# Patient Record
Sex: FEMALE | Race: WHITE
[De-identification: names, ages, dates, MRNs, and addresses within clinical notes are randomized per-mention and may not be internally consistent; named-entity substitution may affect disease eponyms.]

---

## 2019-01-21 ENCOUNTER — HOSPITAL ENCOUNTER (INPATIENT)
Dept: HOSPITAL 35 - ER | Age: 59
LOS: 3 days | Discharge: HOME | DRG: 881 | End: 2019-01-24
Attending: PSYCHIATRY & NEUROLOGY | Admitting: PSYCHIATRY & NEUROLOGY
Payer: COMMERCIAL

## 2019-01-21 VITALS — HEIGHT: 65 IN | WEIGHT: 175 LBS | BODY MASS INDEX: 29.16 KG/M2

## 2019-01-21 VITALS — SYSTOLIC BLOOD PRESSURE: 124 MMHG | DIASTOLIC BLOOD PRESSURE: 79 MMHG

## 2019-01-21 VITALS — DIASTOLIC BLOOD PRESSURE: 85 MMHG | SYSTOLIC BLOOD PRESSURE: 126 MMHG

## 2019-01-21 VITALS — SYSTOLIC BLOOD PRESSURE: 141 MMHG | DIASTOLIC BLOOD PRESSURE: 93 MMHG

## 2019-01-21 VITALS — DIASTOLIC BLOOD PRESSURE: 86 MMHG | SYSTOLIC BLOOD PRESSURE: 128 MMHG

## 2019-01-21 VITALS — SYSTOLIC BLOOD PRESSURE: 126 MMHG | DIASTOLIC BLOOD PRESSURE: 85 MMHG

## 2019-01-21 DIAGNOSIS — Z23: ICD-10-CM

## 2019-01-21 DIAGNOSIS — R45.851: ICD-10-CM

## 2019-01-21 DIAGNOSIS — F32.9: Primary | ICD-10-CM

## 2019-01-21 DIAGNOSIS — F68.8: ICD-10-CM

## 2019-01-21 DIAGNOSIS — R51: ICD-10-CM

## 2019-01-21 DIAGNOSIS — Z85.3: ICD-10-CM

## 2019-01-21 DIAGNOSIS — F41.9: ICD-10-CM

## 2019-01-21 DIAGNOSIS — R41.9: ICD-10-CM

## 2019-01-21 DIAGNOSIS — F10.10: ICD-10-CM

## 2019-01-21 DIAGNOSIS — Z90.12: ICD-10-CM

## 2019-01-21 DIAGNOSIS — Z59.0: ICD-10-CM

## 2019-01-21 DIAGNOSIS — Z90.710: ICD-10-CM

## 2019-01-21 DIAGNOSIS — F12.10: ICD-10-CM

## 2019-01-21 LAB
ALBUMIN SERPL-MCNC: 4 G/DL (ref 3.4–5)
ALT SERPL-CCNC: 60 U/L (ref 30–65)
ANION GAP SERPL CALC-SCNC: 13 MMOL/L (ref 7–16)
AST SERPL-CCNC: 38 U/L (ref 15–37)
BILIRUB DIRECT SERPL-MCNC: < 0.1 MG/DL
BILIRUB SERPL-MCNC: 0.1 MG/DL
BILIRUB UR-MCNC: NEGATIVE MG/DL
BUN SERPL-MCNC: 15 MG/DL (ref 7–18)
CALCIUM SERPL-MCNC: 8.7 MG/DL (ref 8.5–10.1)
CHLORIDE SERPL-SCNC: 106 MMOL/L (ref 98–107)
CO2 SERPL-SCNC: 25 MMOL/L (ref 21–32)
COLOR UR: YELLOW
CREAT SERPL-MCNC: 0.7 MG/DL (ref 0.6–1)
ERYTHROCYTE [DISTWIDTH] IN BLOOD BY AUTOMATED COUNT: 13 % (ref 10.5–14.5)
FOLATE SERPL-MCNC: 19.2 NG/ML (ref 8.6–58.9)
GLUCOSE SERPL-MCNC: 112 MG/DL (ref 74–106)
HCT VFR BLD CALC: 45.1 % (ref 37–47)
HGB BLD-MCNC: 15.5 GM/DL (ref 12–15)
KETONES UR STRIP-MCNC: NEGATIVE MG/DL
MCH RBC QN AUTO: 33.2 PG (ref 26–34)
MCHC RBC AUTO-ENTMCNC: 34.3 G/DL (ref 28–37)
MCV RBC: 96.8 FL (ref 80–100)
PLATELET # BLD: 272 THOU/UL (ref 150–400)
POTASSIUM SERPL-SCNC: 3.8 MMOL/L (ref 3.5–5.1)
PROT SERPL-MCNC: 7.6 G/DL (ref 6.4–8.2)
RBC # BLD AUTO: 4.66 MIL/UL (ref 4.2–5)
RBC # UR STRIP: NEGATIVE /UL
SODIUM SERPL-SCNC: 144 MMOL/L (ref 136–145)
SP GR UR STRIP: >= 1.03 (ref 1–1.03)
URINE CLARITY: CLEAR
URINE GLUCOSE-RANDOM*: NEGATIVE
URINE LEUKOCYTES-REFLEX: NEGATIVE
URINE NITRITE-REFLEX: NEGATIVE
URINE PROTEIN (DIPSTICK): NEGATIVE
UROBILINOGEN UR STRIP-ACNC: 0.2 E.U./DL (ref 0.2–1)
VIT B12 SERPL-MCNC: 436 PG/ML (ref 193–986)
WBC # BLD AUTO: 6.7 THOU/UL (ref 4–11)

## 2019-01-21 PROCEDURE — 10880: CPT

## 2019-01-21 SDOH — ECONOMIC STABILITY - HOUSING INSECURITY: HOMELESSNESS: Z59.0
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
## 2019-01-21 NOTE — NUR
PATIENT ADMITTED [facility] [facility], ROOM 520
FOR SI.  PATIENT PRESENTED TO [facility] ED AT APPROXIMATELY
5AM THIS DATE.  STATED TO ED STAFF THAT SHE WAS WANTING TO KILL HERSELF.
PATIENT TOLD THIS NURSE DURING [facility] SCREENING THAT "MY
 KICKED ME OUT," AND THAT SHE WAS HOMELESS AT THE PRESENT TIME.
PATIENT DENIED HOMICIDAL IDEATION; HOWEVER, PATIENT SEEMED TO HESITATE, TWICE,
WHEN QUESTIONED ABOUT ANY THOOUGHTS OF HARMING SOMEONE ELSE.  PATIENT WAS
HOSPITALIZED AT A TREATMENT CENTER IN [place], [place] FOR ALCOHOLISM ABOUT
THREE MONTHS; WAS IN-PATIENT FOR ABOUT A MONTH.  tAKES NO MEDICATION OF ANY
KIND AT PRESENT.  HOWEVER, WAS TAKING LEXAPRO, 20 MG, UNTIL ABOUT A MONTH AGO.
STATED THAT SHE "TAPERED HERSELF OFF BECAUSE I THOUGHT THAT I COULD DO IT."
AWAITING NEW MEDICATION ORDERS.
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
## 2019-01-21 NOTE — NUR
JANES mwt with Colleen Corteser from [facility] concerning admission of Pt.  Ms. Burgos stated they can admit Pt whenever she is discharged from [facility].  Ms. Burgos also stated she will fax over some paper work that would need
to be completed by SW prior to Pt admission.  -+
 
 
 
 
 
 
 
 
 
 
 
 
 
 
 
 
 
 
 
 
 
 
 
 
 
 
 
SW met with Colleen Burgos from [facility].  Ms. Burgos stated  the Pt can be
admitted upon discharge.  Ms. Burgos also infomed she would be sending over
some paper work that would need to be completed by SW prior to Pt being
admitted. I provided Ms. Burgos with [employer]'s business card and  and
informed her to contact [employer] concerning the paper ork as she is the SW on
duty M-F.  Ms. Burgos did not have any further questions or concerne

## 2019-01-22 VITALS — DIASTOLIC BLOOD PRESSURE: 93 MMHG | SYSTOLIC BLOOD PRESSURE: 142 MMHG

## 2019-01-22 VITALS — DIASTOLIC BLOOD PRESSURE: 82 MMHG | SYSTOLIC BLOOD PRESSURE: 113 MMHG

## 2019-01-22 VITALS — SYSTOLIC BLOOD PRESSURE: 113 MMHG | DIASTOLIC BLOOD PRESSURE: 82 MMHG
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
## 2019-01-22 NOTE — NUR
PT COOPERATIVE. DENIES SI. CLAIMS SHE IS WORRIED WHERE SHE WILL GO FROM HERE.
SEEMS CLEAR TO HER THAT SHE NEEDS TO ADDRESS SUSTANCE ABUSE ISSUES, AND
DEPRESSION TOGETHER TO CLARIFY THE FUTURE. QUIET AND COOPERATIVE. STAYED IN
HER ROOM ALL EVENING, READING AND NAPPING. SLEPT WELL ON C/O THROUGH THE
NIGHT.

## 2019-01-22 NOTE — NUR
CLIENT DENIES HEADACHE AT THIS TIME. CLIENT STATED SHE HAS SOME NAUSEA. CLIENT
UP AD RICHA IN ROOM AND GAMBLE. LUNGS CLEAR. WANTS SHOWER THIS AM AND PUT ON
REGULAR CLOTHES. DENIES ANY ETOH WITHDRAWL SUCH AS TREMORS OR HEADACHE.

## 2019-01-22 NOTE — NUR
CLIENT HAS DENIED ANY ETOH WITHDRAWL SYMPTOMS. READING MAGAZINE IN DINNING
ROOM MOST OF DAY, TOOK A SHOWER TODAY, AND VISITED WITH NURSE AND

## 2019-01-23 VITALS — SYSTOLIC BLOOD PRESSURE: 119 MMHG | DIASTOLIC BLOOD PRESSURE: 77 MMHG

## 2019-01-23 VITALS — DIASTOLIC BLOOD PRESSURE: 89 MMHG | SYSTOLIC BLOOD PRESSURE: 133 MMHG

## 2019-01-23 NOTE — NUR
ASSUMED PT CARE 1900. PT ALERT AND ORIENTED. CURRENTLY READING IN BED. PT
DENIES N/V, HA, TREMORS, OTHER SYMPTOMS OF WITHDRAW. PT CALM AND COOPERATIVE.
STATES SHE AND  HAD GOOD VISIT AND HAVE A PLAN IN PLACE UPON D/C.
CONTINUING TO MONITOR, WILL CONTINUE POC UNTIL EOS.

## 2019-01-23 NOTE — NUR
PATIENT IS ALERT AND ORIENTED X 4, UP AND OUT ON THE UNIT. PATIENT TOOK ALL
MEDICATION WHOLE WITHOUT DIFFICULTY. PATIENT DENIES SIGNS OF ALCOHOL
WITHDRAWAL, NO TREMORS, OR ANXIETY NOTED.  PATIENT DENIES SUICIDAL AND
HOMOCIDAL IDEATION.  SHE RATED DEPRESSION 5/10, SHE DENIES ANXIETY. PATIENT
WAS TEARFUL THIS MORNING, " I DON'T KNOW WHERE TO GO WHEN I AM DISCHARGED"
PATIENT STATES SHE FEELS GREAT AFTER HAVING A FRUITFUL MEETING WITH HER
 AND THE DOCTOR.  AFFECT IS BRIGHT, SHE IS CURRENTLY IN DAY ROOM
WATCHING TV, WILL MONITOR FOR SAFETY.

## 2019-01-23 NOTE — NUR
ALBERT SLEPT GOOD,  REQUESTED DOOR BE CLOSED DUE TO TOO MANY WANDERERS AND
TALKING LOUD,  KEPT HELSELF BUSY BY READING BOOK.  DENIES PAIN, TOOK MEDS WITH
NO PROBLEM, REFUSED THE COLACE, SAID SHE IS VERY REGULAR,  NO BEHAVIOUR
PROBLEM NOTED, CONTINUE TO MONITOR FOR ALCOHOL WITHDRAWAL SYMPTOMS.

## 2019-01-24 VITALS — DIASTOLIC BLOOD PRESSURE: 81 MMHG | SYSTOLIC BLOOD PRESSURE: 114 MMHG

## 2019-01-24 VITALS — SYSTOLIC BLOOD PRESSURE: 114 MMHG | DIASTOLIC BLOOD PRESSURE: 81 MMHG

## 2019-01-24 NOTE — NUR
JANES met with pt to schedule her appointment with [facility] for the Php
program. Pt stated that she has lucio with depression, and alcoholism. Pt
stated that she drinks a pint of Vodka a day or even more if she feels severly
depressed. JANES was able to schedule appointment on February 1, 2019 for dual
diagnosis. JANES will follow-up with pt upon discharge.

## 2019-01-24 NOTE — NUR
ASSUMED PATIENT CARE AT 0700.  PATIENT UP IN HER BATHROOM, BRUSHING HER TEETH.
CALM AND COOPERATIVE; SAD AFFECT.  NO BEHAVIORAL ISSUES, NO S/S OF ALCOHOL
WITHDRAWAL.  VITAL SIGNS STABLE.  TOOK ALL A.M. MEDICATIONS.  DISCHARGE BEING
PLANNED TODAY.  FAMILY WILL PICK HER UP SOMETIME AFTER 3 P.M., PER PATIENT.
 
CONTINUE TO MONITOR.

## 2019-01-24 NOTE — NUR
Patient Name: ALBERT PIERRE
Admission Date: 01/21/19
DISCHARGE PLAN: Pt plans to discharge to her parents home in [place].
Care Assessment: Pt reassesed for PhP program through [facility] in [facility].
Level II Assessment: There was no level II screening done due to pt cognitive
enough to take care of self.
Transportation: Pt was transported by her father.
Special Instructions/Notes: Pt has appointment with [facility] on February 1,
2019. At 1:00pm. Pt was given an print out of her appointment.
 
DISCHARGE TO FACILITY: None
Facility:    Phone:   Fax:
Address:
Contact Name:   Phone:
PCP: VIOLETA
Psychiatrist: [facility] assigned psychiatrist.

## 2019-01-28 NOTE — D
[facility]
[address]
[place]   [address]                     DISCHARGE SUMMARY             
_______________________________________________________________________________
 
Name:       ALBERT PIERRE                  Room #:         520B-B      [facility] [place]  
M.R.#:      [id number]                       Account #:      [id number]  
Admission:  01/21/19    Attend Phys:    Juaquin Zafar DO
Discharge:  01/24/19    Date of Birth:  [birth year]  
                                                          Report #: [id number]
                                                                    [id number]   
_______________________________________________________________________________
THIS REPORT FOR:   //name//                          
 
CC: Juaquin Zafar
    Charlie Quinetro
 
DATE OF SERVICE:  01/24/2019
 
 
MEDICAL CONSULTANT THIS ADMISSIONS:  Travis Childers MD.
 
DISCHARGE DIET:  Regular.  Absolutely no alcohol, illicit drugs including
marijuana.  Activity level as tolerated.
 
DISCHARGE MEDICATIONS:  As follows:  Citalopram 10 mg oral daily for depression,
is given #30.  Calcium carbonate with vitamin D3 of 600 mg 2 tabs p.o. daily. 
Docusate, Colace, 100 mg oral twice, use over-the-counter.  Famotidine 20 mg
twice a day over-the-counter, cyanocobalamin 1000 mcg oral daily for suboptimal
vitamin B12 and prenatal chewable vitamin for supplementation. 
 
Aftercare as follows:  The patient will be discharged to her parents' home.  The
patient is scheduled for appointment with [facility] for PHP treatment.  She is
battling with depression, alcoholism.  Appointment scheduled for 02/01/2019 for
dual diagnosis PHP, transport own, by her father.  Appointment time:  1:00 p.m.
 
REASON FOR ADMISSION:  Suicidal ideation, superficial lacerations to her left
wrist.  Also, recent binge drinking of alcohol.
 
HOSPITAL COURSE:  The patient was admitted to the Geriatric Psychiatry Unit. 
She was placed on a CIWA protocol.  She did not use any lorazepam or have any
withdrawal complications.  She does not meet criteria for major depression,
however, benefited from being on antidepressants in the past.  I elected to
restart her on citalopram 10 mg daily.  She tolerated this well.  She was
sleeping well, eating well, prior to day of discharge, not voicing suicidal or
homicidal ideations, felt to be stable for discharge to the community.
 
VITAL SIGNS:  On day of discharge, temperature 35.7, pulse rate 80, respirations
18, /81.
 
LABORATORIES THIS ADMISSION:  Urine was within normal limits.  Acetaminophen was
less than 2.  Serum alcohol 150 at admission.  Marijuana was positive.  UDS was
otherwise negative.  Chemistries' estimation within normal limits except for
glucose 112.  On her liver function panel:  AST was 38, ALT is 60.  Vitamin B12
was 436 and was under 500.  I started her on daily 1000 mcg cyanocobalamin
replacement.  Folate was 19.2 within normal limits.  TSH was 1.501.  Hematology,
the patient had a hemoglobin of 15.5, which was slightly elevated, perhaps due
relative dehydration.  White blood cell count 6.7 and platelet count 272.
 
 
 
[address]   [address]                     DISCHARGE SUMMARY             
_______________________________________________________________________________
 
Name:       ALBERT PIERRE                  Room #:         520B-B      [facility] [place]  
M.R.#:      [id number]                       Account #:      [id number]  
Admission:  01/21/19    Attend Phys:    Juaquin Zafar DO
Discharge:  01/24/19    Date of Birth:  [birth year]  
                                                          Report #: [id number]
                                                                    [id number]   
_______________________________________________________________________________
 
MENTAL STATUS EXAMINATION:  This is a well-developed, well-nourished 
female, who is appearing stated age.  Attention and concentration are ____. 
Thought process linear and goal directed.  Her thought content focused on
discharge.  She has no psychomotor retardation and auditory or visual
hallucination.  Denies suicidal or homicidal plan or intent.  Denied
hopelessness or helplessness.  Denied homicidal intent or plan.  Memory not
formally tested today, but on her previous admission her MOcA score was 22/30
with some memory impairment.  Insight fair.  Judgment fair.  Fund of knowledge,
no greater than average.
 
Prognosis for the patient is guarded given her homelessness and recurrent
alcohol relapses.
 
MUSCULOSKELETAL EXAM:  Normal gait and station.
 
 
 
 
 
 
 
 
 
 
 
 
 
 
 
 
 
 
 
 
 
 
 
 
 
 
 
 
 
  <ELECTRONICALLY SIGNED>
   By: Juaquin Zafar DO        
  01/28/19     1555
D: 01/24/19 1725                           _____________________________________
T: 01/24/19 1851                           Juaquin Zafar DO          /nt